# Patient Record
Sex: MALE | Race: OTHER
[De-identification: names, ages, dates, MRNs, and addresses within clinical notes are randomized per-mention and may not be internally consistent; named-entity substitution may affect disease eponyms.]

---

## 2018-06-12 ENCOUNTER — HOSPITAL ENCOUNTER (EMERGENCY)
Dept: HOSPITAL 72 - EMR | Age: 43
Discharge: HOME | End: 2018-06-12
Payer: SELF-PAY

## 2018-06-12 VITALS — DIASTOLIC BLOOD PRESSURE: 78 MMHG | SYSTOLIC BLOOD PRESSURE: 125 MMHG

## 2018-06-12 VITALS — BODY MASS INDEX: 27.31 KG/M2 | WEIGHT: 160 LBS | HEIGHT: 64 IN

## 2018-06-12 VITALS — SYSTOLIC BLOOD PRESSURE: 125 MMHG | DIASTOLIC BLOOD PRESSURE: 78 MMHG

## 2018-06-12 DIAGNOSIS — M25.512: Primary | ICD-10-CM

## 2018-06-12 DIAGNOSIS — R20.0: ICD-10-CM

## 2018-06-12 DIAGNOSIS — R20.2: ICD-10-CM

## 2018-06-12 PROCEDURE — 99284 EMERGENCY DEPT VISIT MOD MDM: CPT

## 2018-06-12 NOTE — EMERGENCY ROOM REPORT
History of Present Illness


General


Chief Complaint:  Upper Extremity Injury


Source:  Patient





Present Illness


HPI


42-year-old male patient presents ER complaining of left shoulder pain for the 

past 2 weeks and numbness in his finger since this morning.  Reports that pain 

is worse with movement.  Reports she works as a .  Denies symptoms in 

other arm.  Denies history of diabetes.  Denies history of cardiovascular 

disease.  Denies chest pain, shortness breath, abdominal pain.  Reports has 

been working during these past 2 weeks and symptoms seem to be worse after 

work. denies acute trauma. reports right-hand dominant.


Allergies:  


Coded Allergies:  


     No Known Allergies (Unverified , 6/12/18)





Patient History


Past Medical History:  see triage record


Reviewed Nursing Documentation:  PMH: Agreed; PSxH: Agreed





Nursing Documentation-PMH


Past Medical History:  No Stated History





Review of Systems


All Other Systems:  negative except mentioned in HPI





Physical Exam





Vital Signs








  Date Time  Temp Pulse Resp B/P (MAP) Pulse Ox O2 Delivery O2 Flow Rate FiO2


 


6/12/18 17:23 98.1 73 18 125/78 96 Room Air  





 98.1       








Sp02 EP Interpretation:  reviewed, normal


General Appearance:  well appearing, no apparent distress, alert, GCS 15, non-

toxic


Head:  normocephalic, atraumatic


Eyes:  bilateral eye normal inspection, bilateral eye PERRL


ENT:  hearing grossly normal, normal pharynx, no angioedema, normal voice, 

uvula midline, moist mucus membranes


Neck:  full range of motion


Respiratory:  lungs clear, normal breath sounds, no rhonchi, no respiratory 

distress, no accessory muscle use, no wheezing, speaking full sentences


Cardiovascular #1:  regular rate, rhythm, no edema


Cardiovascular #2:  2+ radial (R), 2+ radial (L)


Musculoskeletal:  back normal, digits/nails normal, gait/station normal, normal 

range of motion, non-tender, other - left shoulder: negative Adson tests, 

negative drop arm, positive Farmer, negative belly press


Neurologic:  alert, oriented x3, responsive, motor strength/tone normal, 

sensory intact


Psychiatric:  mood/affect normal


Skin:  no rash





Medical Decision Making


PA Attestation


Dr. Mccormack is my supervising Physician whom patient management has been 

discussed with.


Diagnostic Impression:  


 Primary Impression:  


 Shoulder pain


 Additional Impression:  


 Numbness and tingling in left hand


ER Course


Pt. presents to the ED c/o left shoulder pain and hand numbness.





Ddx considered but are not limited to fracture, sprain, strain, contusion, 

dislocation, nerve impingement.


no history of cancer or drug use, no hormone medication, low suspicion for DVT 

at this time.





Vital signs: are WNL, pt. is afebrile





Ordered X-ray and pain medication.





ER COURSE


Provided with pain medication.


physical exam shows Refill less than 2 seconds, negative Adson test, NVI, 

radial pulses 2+.


no recent trauma, full range of motion of arm, patient  does not require 

imaging at this time.  Low suspicion for fracture.


Positive Farmer test, likely rotator cuff impingement or tendinitis causing 

symptoms.


Take ibuprofen and muscle relaxant for pain.


reports pain with relief with holding left arm up hand resting on right 

shoulder.  Will provide patient with sling.  Instructed patient to perform 

range of motion exercises to prevent stiffness.





Patient instructed on RICE method: rest, ice, compression, elevation.


Followup with primary care provider. Discuss referral to ortho/pain management/

PT as needed. Discuss further imaging with MRI/CT as needed.


ER precautions given.





DISCHARGE:


-Rx provided for Ibuprofen for pain symptoms.


-Rx provided for Methocarbamol. SE drowsiness, do not drink, drive, or operate 

heavy machinery while using.





At this time pt. is stable for d/c to home. Patient is resting comfortably, in 

no acute distress, nontoxic appearing, talking without difficulty.


Will provide printed patient care instructions, and any necessary prescriptions.


Patient instructed to follow with primary care provider in 3 - 5 days and to 

request further follow-up as needed.


Care plan and follow up instructions have been discussed with the patient prior 

to discharge.


Take medications as directed. 


Patient questions asked and answered.


Patient reports understanding and agreement to treatment plan.


ER precautions given, patient instructed to return to ER immediately for any 

new or worsening of symptoms.





- Please note that this Emergency Department Report was dictated using [id number] technology software, occasionally this can lead to 

erroneous entry secondary to interpretation by the dictation equipment.





Last Vital Signs








  Date Time  Temp Pulse Resp B/P (MAP) Pulse Ox O2 Delivery O2 Flow Rate FiO2


 


6/12/18 17:34 98.1 80 18 125/78 96 Room Air  





 98.1       








Disposition:  HOME, SELF-CARE


Condition:  Stable


Scripts


Ibuprofen* (MOTRIN*) 600 Mg Tablet


600 MG ORAL Q8H PRN for For Pain, #30 TAB 0 Refills


   Prov: Syed Mckeon         6/12/18 


Methocarbamol* (METHOCARBAMOL*) 500 Mg Tablet


500 MG ORAL TID PRN for For Pain, #15 TAB 0 Refills


   Prov: Syed Mckeon         6/12/18


Patient Instructions:  Generic Shoulder Exercises-SportsMed, Rotator Cuff 

Tendinitis, Shoulder Pain, Easy-to-Read





Additional Instructions:  


Patient instructed to follow up with primary care provider and discuss further 

referral to orthopedics. Discuss referral to the physical therapy and neurology 

as needed.


Patient instructed on RICE method: rest, ice, compression, elevation.


Patient instructed to WBAT.


Take medications as directed. 


Patient questions asked and answered.


ER precautions given, patient instructed to return to ER immediately for any 

new or worsening of symptoms.











Syed Mckeon Jun 12, 2018 18:04